# Patient Record
Sex: FEMALE | Race: WHITE | URBAN - METROPOLITAN AREA
[De-identification: names, ages, dates, MRNs, and addresses within clinical notes are randomized per-mention and may not be internally consistent; named-entity substitution may affect disease eponyms.]

---

## 2023-08-28 ENCOUNTER — OFFICE VISIT (OUTPATIENT)
Dept: FAMILY MEDICINE CLINIC | Facility: CLINIC | Age: 28
End: 2023-08-28

## 2023-08-28 VITALS
WEIGHT: 211 LBS | SYSTOLIC BLOOD PRESSURE: 121 MMHG | TEMPERATURE: 99 F | HEIGHT: 66.9 IN | BODY MASS INDEX: 33.12 KG/M2 | DIASTOLIC BLOOD PRESSURE: 83 MMHG | HEART RATE: 108 BPM

## 2023-08-28 DIAGNOSIS — F41.1 GENERALIZED ANXIETY DISORDER: ICD-10-CM

## 2023-08-28 DIAGNOSIS — Z00.00 WELL ADULT EXAM: Primary | ICD-10-CM

## 2023-08-28 DIAGNOSIS — F42.9 OBSESSIVE-COMPULSIVE DISORDER, UNSPECIFIED TYPE: ICD-10-CM

## 2023-08-28 DIAGNOSIS — Z86.19 HISTORY OF CHICKEN POX: ICD-10-CM

## 2023-08-28 DIAGNOSIS — E66.9 OBESITY (BMI 30.0-34.9): ICD-10-CM

## 2023-08-28 PROBLEM — E66.811 OBESITY (BMI 30.0-34.9): Status: ACTIVE | Noted: 2023-08-28

## 2023-08-28 RX ORDER — BUPROPION HYDROCHLORIDE 100 MG/1
100 TABLET, EXTENDED RELEASE ORAL 2 TIMES DAILY
COMMUNITY
Start: 2023-08-10

## 2023-08-28 RX ORDER — FLUVOXAMINE MALEATE 100 MG
200 TABLET ORAL NIGHTLY
COMMUNITY
Start: 2023-08-10

## 2023-09-02 ENCOUNTER — LAB ENCOUNTER (OUTPATIENT)
Dept: LAB | Age: 28
End: 2023-09-02
Attending: FAMILY MEDICINE
Payer: COMMERCIAL

## 2023-09-02 DIAGNOSIS — Z00.00 WELL ADULT EXAM: ICD-10-CM

## 2023-09-02 LAB
ALBUMIN SERPL-MCNC: 3.7 G/DL (ref 3.4–5)
ALBUMIN/GLOB SERPL: 1 {RATIO} (ref 1–2)
ALP LIVER SERPL-CCNC: 81 U/L
ALT SERPL-CCNC: 35 U/L
ANION GAP SERPL CALC-SCNC: 9 MMOL/L (ref 0–18)
AST SERPL-CCNC: 14 U/L (ref 15–37)
BASOPHILS # BLD AUTO: 0.03 X10(3) UL (ref 0–0.2)
BASOPHILS NFR BLD AUTO: 0.4 %
BILIRUB SERPL-MCNC: 0.3 MG/DL (ref 0.1–2)
BUN BLD-MCNC: 8 MG/DL (ref 7–18)
BUN/CREAT SERPL: 9.3 (ref 10–20)
CALCIUM BLD-MCNC: 9.1 MG/DL (ref 8.5–10.1)
CHLORIDE SERPL-SCNC: 110 MMOL/L (ref 98–112)
CHOLEST SERPL-MCNC: 147 MG/DL (ref ?–200)
CO2 SERPL-SCNC: 23 MMOL/L (ref 21–32)
CREAT BLD-MCNC: 0.86 MG/DL
DEPRECATED RDW RBC AUTO: 45.2 FL (ref 35.1–46.3)
EGFRCR SERPLBLD CKD-EPI 2021: 94 ML/MIN/1.73M2 (ref 60–?)
EOSINOPHIL # BLD AUTO: 0.16 X10(3) UL (ref 0–0.7)
EOSINOPHIL NFR BLD AUTO: 1.9 %
ERYTHROCYTE [DISTWIDTH] IN BLOOD BY AUTOMATED COUNT: 14.1 % (ref 11–15)
FASTING PATIENT LIPID ANSWER: YES
FASTING STATUS PATIENT QL REPORTED: YES
GLOBULIN PLAS-MCNC: 3.7 G/DL (ref 2.8–4.4)
GLUCOSE BLD-MCNC: 91 MG/DL (ref 70–99)
HCT VFR BLD AUTO: 36.3 %
HDLC SERPL-MCNC: 54 MG/DL (ref 40–59)
HGB BLD-MCNC: 12.2 G/DL
IMM GRANULOCYTES # BLD AUTO: 0.02 X10(3) UL (ref 0–1)
IMM GRANULOCYTES NFR BLD: 0.2 %
LDLC SERPL CALC-MCNC: 81 MG/DL (ref ?–100)
LYMPHOCYTES # BLD AUTO: 2.12 X10(3) UL (ref 1–4)
LYMPHOCYTES NFR BLD AUTO: 25.6 %
MCH RBC QN AUTO: 29.5 PG (ref 26–34)
MCHC RBC AUTO-ENTMCNC: 33.6 G/DL (ref 31–37)
MCV RBC AUTO: 87.9 FL
MONOCYTES # BLD AUTO: 0.53 X10(3) UL (ref 0.1–1)
MONOCYTES NFR BLD AUTO: 6.4 %
NEUTROPHILS # BLD AUTO: 5.41 X10 (3) UL (ref 1.5–7.7)
NEUTROPHILS # BLD AUTO: 5.41 X10(3) UL (ref 1.5–7.7)
NEUTROPHILS NFR BLD AUTO: 65.5 %
NONHDLC SERPL-MCNC: 93 MG/DL (ref ?–130)
OSMOLALITY SERPL CALC.SUM OF ELEC: 292 MOSM/KG (ref 275–295)
PLATELET # BLD AUTO: 330 10(3)UL (ref 150–450)
POTASSIUM SERPL-SCNC: 3.8 MMOL/L (ref 3.5–5.1)
PROT SERPL-MCNC: 7.4 G/DL (ref 6.4–8.2)
RBC # BLD AUTO: 4.13 X10(6)UL
SODIUM SERPL-SCNC: 142 MMOL/L (ref 136–145)
TRIGL SERPL-MCNC: 58 MG/DL (ref 30–149)
TSI SER-ACNC: 1.27 MIU/ML (ref 0.36–3.74)
VLDLC SERPL CALC-MCNC: 9 MG/DL (ref 0–30)
WBC # BLD AUTO: 8.3 X10(3) UL (ref 4–11)

## 2023-09-02 PROCEDURE — 80053 COMPREHEN METABOLIC PANEL: CPT

## 2023-09-02 PROCEDURE — 80061 LIPID PANEL: CPT

## 2023-09-02 PROCEDURE — 36415 COLL VENOUS BLD VENIPUNCTURE: CPT

## 2023-09-02 PROCEDURE — 84443 ASSAY THYROID STIM HORMONE: CPT

## 2023-09-02 PROCEDURE — 85025 COMPLETE CBC W/AUTO DIFF WBC: CPT

## 2023-09-18 ENCOUNTER — OFFICE VISIT (OUTPATIENT)
Dept: FAMILY MEDICINE CLINIC | Facility: CLINIC | Age: 28
End: 2023-09-18

## 2023-09-18 VITALS
SYSTOLIC BLOOD PRESSURE: 130 MMHG | HEART RATE: 116 BPM | HEIGHT: 66.9 IN | DIASTOLIC BLOOD PRESSURE: 81 MMHG | BODY MASS INDEX: 32.8 KG/M2 | TEMPERATURE: 99 F | WEIGHT: 209 LBS

## 2023-09-18 DIAGNOSIS — Z11.3 SCREENING EXAMINATION FOR STD (SEXUALLY TRANSMITTED DISEASE): ICD-10-CM

## 2023-09-18 DIAGNOSIS — Z01.419 ENCOUNTER FOR GYNECOLOGICAL EXAMINATION: Primary | ICD-10-CM

## 2023-09-19 LAB
C TRACH DNA SPEC QL NAA+PROBE: NEGATIVE
N GONORRHOEA DNA SPEC QL NAA+PROBE: NEGATIVE

## 2023-10-09 ENCOUNTER — TELEPHONE (OUTPATIENT)
Dept: FAMILY MEDICINE CLINIC | Facility: CLINIC | Age: 28
End: 2023-10-09

## 2023-10-09 ENCOUNTER — NURSE ONLY (OUTPATIENT)
Dept: FAMILY MEDICINE CLINIC | Facility: CLINIC | Age: 28
End: 2023-10-09

## 2023-10-09 DIAGNOSIS — Z23 IMMUNIZATION DUE: Primary | ICD-10-CM

## 2023-10-09 PROCEDURE — 90471 IMMUNIZATION ADMIN: CPT | Performed by: FAMILY MEDICINE

## 2023-10-09 PROCEDURE — 90715 TDAP VACCINE 7 YRS/> IM: CPT | Performed by: FAMILY MEDICINE

## 2024-09-25 ENCOUNTER — OFFICE VISIT (OUTPATIENT)
Dept: DERMATOLOGY CLINIC | Facility: CLINIC | Age: 29
End: 2024-09-25
Payer: COMMERCIAL

## 2024-09-25 DIAGNOSIS — L30.9 HAND DERMATITIS: Primary | ICD-10-CM

## 2024-09-25 PROCEDURE — 99204 OFFICE O/P NEW MOD 45 MIN: CPT | Performed by: STUDENT IN AN ORGANIZED HEALTH CARE EDUCATION/TRAINING PROGRAM

## 2024-09-25 RX ORDER — CLOBETASOL PROPIONATE 0.5 MG/G
1 CREAM TOPICAL 2 TIMES DAILY
Qty: 60 G | Refills: 3 | Status: SHIPPED | OUTPATIENT
Start: 2024-09-25 | End: 2025-09-25

## 2024-09-25 NOTE — PROGRESS NOTES
New Patient    Referred by: No referring provider defined for this encounter.    CHIEF COMPLAINT: Lesion of concern     HISTORY OF PRESENT ILLNESS: Jane Neal is a 29 year old female here for evaluation of lesion of concern.    1. Growth   Location: R hand  Duration: years; the hand not healing x 3 months   Signs and symptoms: during winter mostly, dry and scaly, itchy and sometimes painful     Personal Dermatologic History  History of skin cancer: No  History of  atypical moles: No    FAMILY HISTORY:  History of melanoma: Yes, paternal grandfather    Past Medical History  No past medical history on file.    REVIEW OF SYSTEMS:  Constitutional: Denies fever, chills, unintentional weight loss.   Skin as per HPI    Medications  Current Outpatient Medications   Medication Sig Dispense Refill    fluvoxaMINE 100 MG Oral Tab Take 2 tablets (200 mg total) by mouth nightly.      buPROPion  MG Oral Tablet 12 Hr Take 1 tablet (100 mg total) by mouth 2 (two) times daily.         PHYSICAL EXAM:  General: awake, alert, no acute distress  Neuropsych: appropriate mood and affect  Eyes: Sclerae anicteric, without conjunctival injection, eyelids unremarkable  Skin: Skin exam was performed today including the following: hands. Pertinent findings include:   - with pink scaly palques    ASSESSMENT & PLAN:  Pathophysiology of diagnoses discussed with patient.  Therapeutic options reviewed. Risks, benefits, and alternatives discussed with patient. Instructions reviewed at length.    #Hand dermatitis  - clobetasol 0.05% twice daily to affected areas Monday-Friday. Take weekends off. Avoid use on face, breasts, groin, or axillae.     Return to clinic: 2 months or sooner if something concerning arises     Álvaro Child MD

## 2024-11-19 ENCOUNTER — OFFICE VISIT (OUTPATIENT)
Dept: DERMATOLOGY CLINIC | Facility: CLINIC | Age: 29
End: 2024-11-19
Payer: COMMERCIAL

## 2024-11-19 DIAGNOSIS — L30.9 HAND DERMATITIS: Primary | ICD-10-CM

## 2024-11-19 PROCEDURE — 99214 OFFICE O/P EST MOD 30 MIN: CPT | Performed by: STUDENT IN AN ORGANIZED HEALTH CARE EDUCATION/TRAINING PROGRAM

## 2024-11-19 NOTE — PROGRESS NOTES
November 19, 2024    Established patient     Referred by:   No referring provider defined for this encounter.      CHIEF COMPLAINT: Rash     HISTORY OF PRESENT ILLNESS: Jane Neal is a 29 year old female here for evaluation of rash.    Location: R Hand  Duration: Years   Signs and symptoms: Pt reports improvement  Current treatment: Clobetasol  Past treatments: Clobetasol    Personal Dermatologic History  History of chronic skin disease: No    Family History  History of chronic skin disease: No  History autoimmune diseases:  No    Past Medical History  Past Medical History:    Acne    Anxiety    Visual impairment    I wear glasses       REVIEW OF SYSTEMS:  Constitutional: Denies fever, chills, unintentional weight loss.   Skin as per [facility]    Medications  Current Outpatient Medications   Medication Sig Dispense Refill    clobetasol 0.05 % External Cream Apply 1 Application topically 2 (two) times daily. Apply twice daily Monday-Friday. Take weekends off. Use on affected areas. Do not use on face, groin, or armpits. 60 g 3    fluvoxaMINE 100 MG Oral Tab Take 2 tablets (200 mg total) by mouth nightly.      buPROPion  MG Oral Tablet 12 Hr Take 150 mg by mouth 2 (two) times daily.         PHYSICAL EXAM:  General: awake, alert, no acute distress  Skin: Skin exam was performed today including the following: habd. Pertinent findings include:   - with pink scaly plaques    ASSESSMENT & PLAN:  Pathophysiology of diagnoses discussed with patient.  Therapeutic options reviewed. Risks, benefits, and alternatives discussed with patient. Instructions reviewed at length.    #Hand dermatitis, improved but continues to flare   #Atopic dermatitis  - clobetasol 0.05% once daily to affected areas Monday-Friday. Take weekends off. Avoid use on face, breasts, groin, or axillae.  - Opzelura 1.5% daily. Risks, benefits, and alternatives discussed with patient. Sample dispensed today. If working well will message and we will send  to [facility].      Return to clinic:  2 months  or sooner if something concerning arises    Álvaro Child MD